# Patient Record
Sex: FEMALE | Race: BLACK OR AFRICAN AMERICAN | NOT HISPANIC OR LATINO | URBAN - METROPOLITAN AREA
[De-identification: names, ages, dates, MRNs, and addresses within clinical notes are randomized per-mention and may not be internally consistent; named-entity substitution may affect disease eponyms.]

---

## 2017-06-28 ENCOUNTER — OUTPATIENT (OUTPATIENT)
Dept: OUTPATIENT SERVICES | Facility: HOSPITAL | Age: 42
LOS: 1 days | End: 2017-06-28

## 2017-06-28 VITALS
TEMPERATURE: 99 F | HEIGHT: 62 IN | DIASTOLIC BLOOD PRESSURE: 80 MMHG | SYSTOLIC BLOOD PRESSURE: 110 MMHG | RESPIRATION RATE: 16 BRPM | WEIGHT: 186.95 LBS | HEART RATE: 85 BPM

## 2017-06-28 DIAGNOSIS — M72.2 PLANTAR FASCIAL FIBROMATOSIS: ICD-10-CM

## 2017-06-28 LAB
HCT VFR BLD CALC: 35 % — SIGNIFICANT CHANGE UP (ref 34.5–45)
HGB BLD-MCNC: 10.6 G/DL — LOW (ref 11.5–15.5)
MCHC RBC-ENTMCNC: 25.5 PG — LOW (ref 27–34)
MCHC RBC-ENTMCNC: 30.3 % — LOW (ref 32–36)
MCV RBC AUTO: 84.1 FL — SIGNIFICANT CHANGE UP (ref 80–100)
PLATELET # BLD AUTO: 434 K/UL — HIGH (ref 150–400)
PMV BLD: 10.5 FL — SIGNIFICANT CHANGE UP (ref 7–13)
RBC # BLD: 4.16 M/UL — SIGNIFICANT CHANGE UP (ref 3.8–5.2)
RBC # FLD: 15.4 % — HIGH (ref 10.3–14.5)
WBC # BLD: 5.54 K/UL — SIGNIFICANT CHANGE UP (ref 3.8–10.5)
WBC # FLD AUTO: 5.54 K/UL — SIGNIFICANT CHANGE UP (ref 3.8–10.5)

## 2017-06-28 NOTE — H&P PST ADULT - NEUROLOGICAL DETAILS
responds to verbal commands/responds to pain/normal strength/sensation intact/alert and oriented x 3

## 2017-06-28 NOTE — H&P PST ADULT - HISTORY OF PRESENT ILLNESS
Pt is a 42 yr old female scheduled for right foot endoscopic plantar fasciotomy with Dr Pavon 7/12/17.

## 2017-06-28 NOTE — H&P PST ADULT - PROBLEM SELECTOR PLAN 1
Pt given pre-op instructions and famotidine and chlorhexidine and verbalized understanding.  UCG ordered am DOS

## 2017-06-28 NOTE — H&P PST ADULT - MUSCULOSKELETAL COMMENTS
Pt c/o of difficulty walking r/t pain right heel and foot - slight swelling noted medial aspect of foot. Hx of right foot pain  for past year - walks for [employer] - has tried OT and is on oral pain relievers with minimal relief. - MRI notes torn ligament

## 2017-06-28 NOTE — H&P PST ADULT - PMH
Herpes Herpes    IUD (intrauterine device) in place    Ligament tear  right ankle  Obesity    Plantar fascial fibromatosis of right foot

## 2017-07-12 ENCOUNTER — OUTPATIENT (OUTPATIENT)
Dept: OUTPATIENT SERVICES | Facility: HOSPITAL | Age: 42
LOS: 1 days | Discharge: HOME | End: 2017-07-12

## 2017-07-12 VITALS
OXYGEN SATURATION: 100 % | SYSTOLIC BLOOD PRESSURE: 124 MMHG | RESPIRATION RATE: 12 BRPM | TEMPERATURE: 98 F | HEART RATE: 75 BPM | DIASTOLIC BLOOD PRESSURE: 68 MMHG

## 2017-07-12 VITALS
WEIGHT: 186.95 LBS | TEMPERATURE: 99 F | OXYGEN SATURATION: 99 % | HEIGHT: 62 IN | RESPIRATION RATE: 16 BRPM | HEART RATE: 85 BPM | SYSTOLIC BLOOD PRESSURE: 110 MMHG | DIASTOLIC BLOOD PRESSURE: 80 MMHG

## 2017-07-12 DIAGNOSIS — M72.2 PLANTAR FASCIAL FIBROMATOSIS: ICD-10-CM

## 2017-07-12 NOTE — ASU DISCHARGE PLAN (ADULT/PEDIATRIC). - NOTIFY
Fever greater than 101/Numbness, color, or temperature change to extremity/Bleeding that does not stop/Pain not relieved by Medications/Inability to Tolerate Liquids or Foods/Unable to Urinate/Persistent Nausea and Vomiting/Swelling that continues

## 2017-07-12 NOTE — ASU DISCHARGE PLAN (ADULT/PEDIATRIC). - SPECIAL INSTRUCTIONS
please keep dressing clean, dry, and intact please keep dressing clean, dry, and intact  DO NOT take any Tylenol (Acetaminophen) or narcotics containing Tylenol until after  5 PM. You received Tylenol during your operation and it can cause damage to your liver if too much is taken within a 24 hour time period.

## 2019-08-07 PROBLEM — M72.2 PLANTAR FASCIAL FIBROMATOSIS: Chronic | Status: ACTIVE | Noted: 2017-06-28

## 2019-08-07 PROBLEM — E66.9 OBESITY, UNSPECIFIED: Chronic | Status: ACTIVE | Noted: 2017-06-28

## 2019-08-07 PROBLEM — Z97.5 PRESENCE OF (INTRAUTERINE) CONTRACEPTIVE DEVICE: Chronic | Status: ACTIVE | Noted: 2017-06-28

## 2019-08-07 PROBLEM — T14.8 OTHER INJURY OF UNSPECIFIED BODY REGION: Chronic | Status: ACTIVE | Noted: 2017-06-28

## 2019-08-07 PROBLEM — B00.9 HERPESVIRAL INFECTION, UNSPECIFIED: Chronic | Status: ACTIVE | Noted: 2017-06-28

## 2019-10-31 ENCOUNTER — OUTPATIENT (OUTPATIENT)
Dept: OUTPATIENT SERVICES | Facility: HOSPITAL | Age: 44
LOS: 1 days | End: 2019-10-31

## 2019-10-31 VITALS
DIASTOLIC BLOOD PRESSURE: 82 MMHG | OXYGEN SATURATION: 99 % | HEIGHT: 62 IN | HEART RATE: 79 BPM | TEMPERATURE: 99 F | SYSTOLIC BLOOD PRESSURE: 108 MMHG | WEIGHT: 199.96 LBS | RESPIRATION RATE: 16 BRPM

## 2019-10-31 DIAGNOSIS — M25.571 PAIN IN RIGHT ANKLE AND JOINTS OF RIGHT FOOT: ICD-10-CM

## 2019-10-31 DIAGNOSIS — B00.9 HERPESVIRAL INFECTION, UNSPECIFIED: ICD-10-CM

## 2019-10-31 DIAGNOSIS — Z01.818 ENCOUNTER FOR OTHER PREPROCEDURAL EXAMINATION: ICD-10-CM

## 2019-10-31 LAB
HCG SERPL-ACNC: < 5 MIU/ML — SIGNIFICANT CHANGE UP
HCT VFR BLD CALC: 36.4 % — SIGNIFICANT CHANGE UP (ref 34.5–45)
HGB BLD-MCNC: 11.5 G/DL — SIGNIFICANT CHANGE UP (ref 11.5–15.5)
MCHC RBC-ENTMCNC: 29.3 PG — SIGNIFICANT CHANGE UP (ref 27–34)
MCHC RBC-ENTMCNC: 31.6 % — LOW (ref 32–36)
MCV RBC AUTO: 92.6 FL — SIGNIFICANT CHANGE UP (ref 80–100)
NRBC # FLD: 0 K/UL — SIGNIFICANT CHANGE UP (ref 0–0)
PLATELET # BLD AUTO: 296 K/UL — SIGNIFICANT CHANGE UP (ref 150–400)
PMV BLD: 10.5 FL — SIGNIFICANT CHANGE UP (ref 7–13)
RBC # BLD: 3.93 M/UL — SIGNIFICANT CHANGE UP (ref 3.8–5.2)
RBC # FLD: 14.3 % — SIGNIFICANT CHANGE UP (ref 10.3–14.5)
WBC # BLD: 6.79 K/UL — SIGNIFICANT CHANGE UP (ref 3.8–10.5)
WBC # FLD AUTO: 6.79 K/UL — SIGNIFICANT CHANGE UP (ref 3.8–10.5)

## 2019-10-31 NOTE — H&P PST ADULT - NSICDXPROBLEM_GEN_ALL_CORE_FT
PROBLEM DIAGNOSES  Problem: Herpes  Assessment and Plan: denies current  out break    Problem: Pain in joint, ankle and foot, right  Assessment and Plan: Scheduled for right foot peroneal tendon repair  11/14/2019  Written & verbal preop instructions, gi prophylaxis & surgical soap given  Pt verbalized good understanding.  Teach back done on surgical soap instructions.

## 2019-10-31 NOTE — H&P PST ADULT - NSICDXPASTMEDICALHX_GEN_ALL_CORE_FT
PAST MEDICAL HISTORY:  Herpes     IUD (intrauterine device) in place     Ligament tear right ankle    Obesity     Plantar fascial fibromatosis of right foot

## 2019-10-31 NOTE — H&P PST ADULT - NEGATIVE ENMT SYMPTOMS
no nasal congestion/no nose bleeds/no ear pain/no tinnitus/no hearing difficulty/no gum bleeding/no throat pain/no dysphagia/no vertigo/no sinus symptoms/no nasal discharge

## 2019-10-31 NOTE — H&P PST ADULT - MUSCULOSKELETAL COMMENTS
Hx of right foot pain  for 2 year - walks for [employer] - has tried OT and is on oral pain relievers with minimal relief. - MRI notes torn ligament

## 2019-10-31 NOTE — H&P PST ADULT - NEGATIVE OPHTHALMOLOGIC SYMPTOMS
no blurred vision L/no photophobia/no blurred vision R/no lacrimation L/no lacrimation R/no diplopia

## 2019-10-31 NOTE — H&P PST ADULT - NSANTHOSAYNRD_GEN_A_CORE
No. FABRIZIO screening performed.  STOP BANG Legend: 0-2 = LOW Risk; 3-4 = INTERMEDIATE Risk; 5-8 = HIGH Risk

## 2019-10-31 NOTE — H&P PST ADULT - LYMPHATIC
supraclavicular R/anterior cervical R/posterior cervical L/posterior cervical R/anterior cervical L/supraclavicular L

## 2019-11-14 ENCOUNTER — OUTPATIENT (OUTPATIENT)
Dept: OUTPATIENT SERVICES | Facility: HOSPITAL | Age: 44
LOS: 1 days | Discharge: HOME | End: 2019-11-14

## 2019-11-14 VITALS
WEIGHT: 199.96 LBS | RESPIRATION RATE: 16 BRPM | HEIGHT: 62 IN | SYSTOLIC BLOOD PRESSURE: 134 MMHG | DIASTOLIC BLOOD PRESSURE: 86 MMHG | TEMPERATURE: 99 F | HEART RATE: 76 BPM | OXYGEN SATURATION: 99 %

## 2019-11-14 VITALS
DIASTOLIC BLOOD PRESSURE: 80 MMHG | TEMPERATURE: 98 F | HEART RATE: 74 BPM | RESPIRATION RATE: 16 BRPM | OXYGEN SATURATION: 100 % | SYSTOLIC BLOOD PRESSURE: 126 MMHG

## 2019-11-14 DIAGNOSIS — M25.571 PAIN IN RIGHT ANKLE AND JOINTS OF RIGHT FOOT: ICD-10-CM

## 2019-11-14 RX ORDER — OMEGA-3 ACID ETHYL ESTERS 1 G
1 CAPSULE ORAL
Qty: 0 | Refills: 0 | DISCHARGE

## 2019-11-14 RX ORDER — VALACYCLOVIR 500 MG/1
1 TABLET, FILM COATED ORAL
Qty: 0 | Refills: 0 | DISCHARGE

## 2019-11-14 RX ORDER — SODIUM CHLORIDE 9 MG/ML
1000 INJECTION, SOLUTION INTRAVENOUS
Refills: 0 | Status: DISCONTINUED | OUTPATIENT
Start: 2019-11-14 | End: 2019-11-30

## 2019-11-14 NOTE — ASU DISCHARGE PLAN (ADULT/PEDIATRIC) - CALL YOUR DOCTOR IF YOU HAVE ANY OF THE FOLLOWING:
Bleeding that does not stop/Swelling that gets worse/Pain not relieved by Medications/Wound/Surgical Site with redness, or foul smelling discharge or pus/Fever greater than (need to indicate Fahrenheit or Celsius)/Numbness, tingling, color or temperature change to extremity Bleeding that does not stop/Pain not relieved by Medications/Swelling that gets worse/Wound/Surgical Site with redness, or foul smelling discharge or pus/Nausea and vomiting that does not stop/Increased irritability or sluggishness/Fever greater than (need to indicate Fahrenheit or Celsius)/Inability to tolerate liquids or foods/Numbness, tingling, color or temperature change to extremity

## 2019-11-14 NOTE — ASU DISCHARGE PLAN (ADULT/PEDIATRIC) - SPECIFY DIET AND FLUID
No fried, spicy or greasy foods. Increase fluids as tolerated  If your doctor prescribed narcotic pain medications after your procedure to help prevent and reduce constipation, increase fluid intake and fiber intake in your diet. You may take OTC Stool Softeners such as Colace to help reduce constipation.
dependent child(vannessa)

## 2019-11-14 NOTE — ASU DISCHARGE PLAN (ADULT/PEDIATRIC) - CARE PROVIDER_API CALL
Jeremy Pavon (DPM)  Surgery  [address] [place] [address]  Phone: [phone]  Fax: [phone]  Follow Up Time:

## 2020-02-25 NOTE — H&P PST ADULT - NSCAFFEAMTFREQ_GEN_ALL_CORE_SD
Sent 15 more tbalets to [facility] UP Saturday AND TAKE AS DIRECTED -FOLLOW  TODAY'S  DOSE INSTRUCTIONS  LET HE R KNOW PLEASE. 1-2 cups/cans per day

## 2022-03-16 NOTE — H&P PST ADULT - PAIN SCALE PREFERRED, PROFILE
----- Message from Daniel Crane MD sent at 3/16/2022  1:38 PM EDT -----  Urine volume is abnormal, uric acid in 24 hours is normal, ask patient if he drinks a lot of water or fluid during the day or he drinks a lot of caffeinated beverage    Recommend Nephrology referral numerical [phone]

## 2022-04-21 PROBLEM — Z00.00 ENCOUNTER FOR PREVENTIVE HEALTH EXAMINATION: Status: ACTIVE | Noted: 2022-04-21

## 2022-08-25 NOTE — ASU PREOP CHECKLIST - TAMPON REMOVED
[facility]    Chief Complaint  Chief Complaint   Patient presents with    Dizziness       HPI:    Patient presents for evaluation of dizziness.  Went to urgent care yesterday for these complaints and was referred here.  Difficulty saying how long she has been having these symptoms but seems to be at least a few months.  No real change that prompted her urgent care visit.  She thinks it is her sinuses.  Has been having lot of drainage.  No vertigo.  Symptoms are not orthostatic.    Medications  Current Outpatient Medications   Medication Sig Dispense Refill    fluticasone propionate (FLOVENT HFA) 220 mcg/actuation inhaler Take 2 puffs by mouth 2 (two) times daily.      albuterol (ACCUNEB) 0.63 mg/3 mL Nebu Take 0.63 mg by nebulization every 6 (six) hours as needed.      alendronate (FOSAMAX) 70 MG tablet Take 70 mg by mouth.      amLODIPine (NORVASC) 5 MG tablet Take 5 mg by mouth once daily.       ascorbic acid, vitamin C, (VITAMIN C) 500 MG tablet Take 500 mg by mouth.      calcipotriene (DOVONOX) 0.005 % cream Apply topically 2 (two) times daily as needed.      cetirizine (ZYRTEC) 10 MG tablet Take 10 mg by mouth once daily.      fluticasone propionate (FLONASE) 50 mcg/actuation nasal spray 1 spray by Each Nostril route.      gabapentin (NEURONTIN) 100 MG capsule Take 100 mg by mouth 2 (two) times daily.      irbesartan (AVAPRO) 300 MG tablet Take 300 mg by mouth once daily.       levocetirizine (XYZAL) 5 MG tablet       levothyroxine (SYNTHROID) 75 MCG tablet Take 75 mcg by mouth once daily.      meloxicam (MOBIC) 15 MG tablet Take 15 mg by mouth once daily.      omeprazole (PRILOSEC) 20 MG capsule       pravastatin (PRAVACHOL) 40 MG tablet Take 1 tablet (40 mg total) by mouth once daily. 90 tablet 3    traMADoL (ULTRAM) 50 mg tablet Take 50 mg by mouth.      vitamin D (VITAMIN D3) 1000 units Tab Take 1,000 Units by mouth.       No current facility-administered medications for  this visit.        History  Past Medical History:   Diagnosis Date    Asthma     Breast cancer     Fibromyalgia     Hypertension     Psoriasis vulgaris      Past Surgical History:   Procedure Laterality Date    APPENDECTOMY      BREAST LUMPECTOMY  7-    breast cancer    BUNIONECTOMY      CORONARY ANGIOGRAPHY      no obstructive CAD    EPIDURAL STEROID INJECTION N/A 2020    Procedure: LUMBAR L2/3 NANCY DIRECT REFERRAL;  Surgeon: Ion Bearden MD;  Location: [facility];  Service: Pain Management;  Laterality: N/A;  NEEDS CONSENT    KNEE ARTHROSCOPY Right 2004    SHOULDER SURGERY  1982    right/had a bone infectioni    THYROIDECTOMY  1977    hyperparathyroid&thyroidectomy 2006    TOTAL KNEE ARTHROPLASTY  10/13/2014     Social History     Socioeconomic History    Marital status:    Tobacco Use    Smoking status: Former Smoker     Quit date: 1999     Years since quittin.1    Smokeless tobacco: Never Used   Substance and Sexual Activity    Alcohol use: No    Drug use: No    Sexual activity: Never     Family History   Problem Relation Age of Onset    Heart disease Mother     Hypertension Mother     Heart disease Father     Hypertension Father     Diabetes Brother     Hypertension Brother     Heart disease Brother     Kidney disease Brother     Skin cancer Neg Hx     Psoriasis Neg Hx         Allergies  Review of patient's allergies indicates:   Allergen Reactions    Atorvastatin Other (See Comments)    Rosuvastatin Other (See Comments)     Felt vibration/myalgia     Sulfamethoxazole-trimethoprim Swelling, Diarrhea and Other (See Comments)       Review of Systems   Review of Systems   Constitutional: Negative for malaise/fatigue, weight gain and weight loss.   Eyes: Negative for visual disturbance.   Cardiovascular: Negative for chest pain, claudication, cyanosis, dyspnea on exertion, irregular heartbeat, leg swelling, near-syncope, orthopnea,  palpitations, paroxysmal nocturnal dyspnea and syncope.   Respiratory: Negative for cough, hemoptysis, shortness of breath, sleep disturbances due to breathing and wheezing.    Hematologic/Lymphatic: Negative for bleeding problem. Does not bruise/bleed easily.   Skin: Negative for poor wound healing.   Musculoskeletal: Negative for muscle cramps and myalgias.   Gastrointestinal: Negative for abdominal pain, anorexia, diarrhea, heartburn, hematemesis, hematochezia, melena, nausea and vomiting.   Genitourinary: Negative for hematuria and nocturia.   Neurological: Positive for dizziness. Negative for excessive daytime sleepiness, focal weakness, light-headedness and weakness.       Physical Exam  Vitals:    08/25/22 1433   BP: (!) 130/56   Pulse: 66     Wt Readings from Last 1 Encounters:   08/25/22 86.2 kg (190 lb)     Physical Exam  Constitutional:       General: She is not in acute distress.     Appearance: She is obese. She is not toxic-appearing or diaphoretic.   HENT:      Head: Normocephalic and atraumatic.   Eyes:      General: No scleral icterus.     Conjunctiva/sclera: Conjunctivae normal.   Neck:      Thyroid: No thyromegaly.      Vascular: No carotid bruit, hepatojugular reflux or JVD.      Trachea: Trachea normal.   Cardiovascular:      Rate and Rhythm: Normal rate and regular rhythm.  No extrasystoles are present.     Chest Wall: PMI is not displaced.      Pulses:           Carotid pulses are 2+ on the right side and 2+ on the left side.       Radial pulses are 2+ on the right side and 2+ on the left side.        Dorsalis pedis pulses are 2+ on the right side and 2+ on the left side.        Posterior tibial pulses are 2+ on the right side and 2+ on the left side.      Heart sounds: S1 normal and S2 normal. No murmur heard.    No S3 or S4 sounds.   Pulmonary:      Effort: No accessory muscle usage or respiratory distress.      Breath sounds: No decreased breath sounds, wheezing, rhonchi or rales.    Abdominal:      General: Bowel sounds are normal. There is no abdominal bruit.      Palpations: Abdomen is soft. There is no hepatomegaly, splenomegaly or pulsatile mass.      Tenderness: There is no abdominal tenderness.   Musculoskeletal:         General: No tenderness or deformity.      Right lower leg: No edema.      Left lower leg: No edema.   Skin:     General: Skin is warm and dry.      Coloration: Skin is not cyanotic or pale.      Nails: There is no clubbing.   Neurological:      General: No focal deficit present.      Mental Status: She is alert and oriented to person, place, and time.   Psychiatric:         Speech: Speech normal.         Behavior: Behavior normal. Behavior is cooperative.         Labs  Office Visit on 08/24/2022   Component Date Value Ref Range Status    POC Glucose 08/24/2022 123 (A) 70 - 110 MG/DL Final    Hemoglobin 08/24/2022 14.4  12.0 - 15.0 g/dL Final    POC Blood, Urine 08/24/2022 Negative  Negative Final    POC Bilirubin, Urine 08/24/2022 Negative  Negative Final    POC Urobilinogen, Urine 08/24/2022 normal  0.1 - 1.1 Final    POC Ketones, Urine 08/24/2022 Negative  Negative Final    POC Protein, Urine 08/24/2022 Negative  Negative Final    POC Nitrates, Urine 08/24/2022 Negative  Negative Final    POC Glucose, Urine 08/24/2022 Negative  Negative Final    pH, UA 08/24/2022 8.0  5 - 8 Final    POC Specific Gravity, Urine 08/24/2022 1.010  1.003 - 1.029 Final    POC Leukocytes, Urine 08/24/2022 Negative  Negative Final   Office Visit on 06/04/2022   Component Date Value Ref Range Status    POC Rapid COVID 06/04/2022 Negative  Negative Final     Acceptable 06/04/2022 Yes   Final       Imaging  No results found.    Assessment  1. Dizziness  Noncardiac in etiology.      Plan and Discussion    Discussed with her primary care provider.  Consider ENT referral.    The 10-year ASCVD risk score (Estephanie JOSE E Jr., et al., 2013) is: 17.9%    Values used to calculate  the score:      Age: 76 years      Sex: Female      Is Non- : Yes      Diabetic: No      Tobacco smoker: No      Systolic Blood Pressure: 130 mmHg      Is BP treated: Yes      HDL Cholesterol: 75 mg/dL      Total Cholesterol: 166 mg/dL     Follow Up  As scheduled      Bryan Mejía MD     n/a
